# Patient Record
Sex: MALE | Race: WHITE
[De-identification: names, ages, dates, MRNs, and addresses within clinical notes are randomized per-mention and may not be internally consistent; named-entity substitution may affect disease eponyms.]

---

## 2019-09-06 ENCOUNTER — HOSPITAL ENCOUNTER (EMERGENCY)
Dept: HOSPITAL 11 - JP.ED | Age: 29
Discharge: HOME | End: 2019-09-06
Payer: COMMERCIAL

## 2019-09-06 DIAGNOSIS — S61.042A: Primary | ICD-10-CM
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
## 2019-09-06 DIAGNOSIS — Z23: ICD-10-CM

## 2019-09-06 PROCEDURE — 90715 TDAP VACCINE 7 YRS/> IM: CPT

## 2019-09-06 PROCEDURE — 73140 X-RAY EXAM OF FINGER(S): CPT

## 2019-09-06 PROCEDURE — 99283 EMERGENCY DEPT VISIT LOW MDM: CPT

## 2019-09-06 PROCEDURE — 64450 NJX AA&/STRD OTHER PN/BRANCH: CPT

## 2019-09-06 PROCEDURE — 90471 IMMUNIZATION ADMIN: CPT

## 2019-09-06 NOTE — CRLCR
INDICATION:



Nail in thumb.



FINDINGS:



Four views of the left thumb show a nail through the soft tissues over the 

distal tip of the 1st distal phalanx. No evidence of acute fracture. No 

other bony or soft tissue abnormalities identified.



Dictated by Ramon Mazariegos MD @ 9/6/2019 9:20:24 AM



Dictated by: Ramon Mazariegos MD @ 09/06/2019 09:21:09



(Electronically Signed)

## 2019-09-06 NOTE — EDM.PDOC
ED HPI GENERAL MEDICAL PROBLEM





- General


Chief Complaint: General


Stated Complaint: NAIL THROUGH LT THUMB


Time Seen by Provider: 09/06/19 08:25


Source of Information: Reports: Patient


History Limitations: Reports: No Limitations





- History of Present Illness


INITIAL COMMENTS - FREE TEXT/NARRATIVE: 





28 yo male got a nail in the distal L thumb today at work. Uncertain tetanus 

status. Here for removal. 


Onset: Today


Onset Date: 09/06/19


Onset Time: 07:05


Duration: Minutes:, Constant


Location: Reports: Upper Extremity, Left


Quality: Reports: Dull


Severity: Mild


Improves with: Reports: Rest


Worsens with: Reports: Movement


Context: Reports: Trauma


Associated Symptoms: Reports: No Other Symptoms


Treatments PTA: Reports: Other (see below) (none)


  ** Left Finger-Thumb


Pain Score (Numeric/FACES): 6





- Related Data


 Allergies











Allergy/AdvReac Type Severity Reaction Status Date / Time


 


No Known Allergies Allergy   Verified 09/06/19 08:23











Home Meds: 


 Home Meds





NK [No Known Home Meds]  09/06/19 [History]











Past Medical History


HEENT History: Reports: Impaired Vision





- Past Surgical History


Head Surgeries/Procedures: Reports: None


HEENT Surgical History: Reports: None





Social & Family History





- Tobacco Use


Smoking Status *Q: Never Smoker


Second Hand Smoke Exposure: No





- Caffeine Use


Caffeine Use: Reports: None





- Alcohol Use


Days Per Week of Alcohol Use: 1


Number of Drinks Per Day: 1


Total Drinks Per Week: 1





- Recreational Drug Use


Recreational Drug Use: No





ED ROS GENERAL





- Review of Systems


Review Of Systems: See Below


Constitutional: Reports: No Symptoms


Musculoskeletal: Reports: No Symptoms


Skin: Reports: Wound (puncture wound L distal thumb)


Neurological: Reports: No Symptoms





ED EXAM, GENERAL





- Physical Exam


Exam: See Below


Exam Limited By: No Limitations


General Appearance: Alert, WD/WN, No Apparent Distress


Extremities: Other (large nail through and through of distal L thumb, no active 

bleeding.)


Neurological: Alert, Oriented, CN II-XII Intact, Normal Cognition, No Motor/

Sensory Deficits


Psychiatric: Normal Affect, Normal Mood


Skin Exam: Warm, Dry, Normal Color, No Rash, Wound/Incision (puncture of L thumb

, nail still imbedded in distal thumb)





Course





- Vital Signs


Text/Narrative:: 





Nail cut at skin level on the entrance side, also cut the little zonia that was 

protruding. He had been digitally blocked with 5 ml of 2% lidocaine in advance. 

His thumb and the nail were soaked in betadine before extraction. 


Last Recorded V/S: 


 Last Vital Signs











Temp  36.0 C   09/06/19 08:27


 


Pulse  74   09/06/19 08:27


 


Resp  16   09/06/19 08:27


 


BP  145/90 H  09/06/19 08:27


 


Pulse Ox  97   09/06/19 08:27














- Orders/Labs/Meds


Orders: 


 Active Orders 24 hr











 Category Date Time Status


 


 Vaccines to be Administered [RC] PER UNIT ROUTINE Care  09/06/19 08:30 Active











Meds: 


Medications














Discontinued Medications














Generic Name Dose Route Start Last Admin





  Trade Name Freq  PRN Reason Stop Dose Admin


 


Hydrocodone Bitart/Acetaminophen  1 tab  09/06/19 09:29  





  [place] 325-5 Mg  PO  09/06/19 09:30  





  ONETIME ONE   





     





     





     





     


 


Bacitracin  1 dose  09/06/19 08:30  09/06/19 08:52





  Bacitracin Oint 1 Gm  TOP  09/06/19 08:31  1 dose





  ONETIME ONE   Administration





     





     





     





     


 


Diphtheria/Tetanus/Acell Pertussis  0.5 ml  09/06/19 08:29  





  Adacel  IM  09/06/19 08:30  





  .ONCE ONE   





     





     





     





     


 


Lidocaine HCl  6 ml  09/06/19 08:30  09/06/19 08:52





  Xylocaine 2%  INJECT  09/06/19 08:31  6 ml





  ONETIME ONE   Administration





     





     





     





     


 


Povidone Iodine  30 ml  09/06/19 08:40  09/06/19 08:52





  Betadine 10% Soln  TOP  09/06/19 08:41  30 ml





  NOW STA   Administration





     





     





     





     














- Radiology Interpretation


Free Text/Narrative:: 





L thumb X-ray-no fx





Departure





- Departure


Time of Disposition: 09:40


Disposition: Home, Self-Care 01


Condition: Fair


Clinical Impression: 


Puncture wound of thumb with foreign body without damage to nail


Qualifiers:


 Encounter type: initial encounter Laterality: left Qualified Code(s): S61.042A 

- Puncture wound with foreign body of left thumb without damage to nail, 

initial encounter





Clinical Impression: 


 (Ruled Out): Foreign body (FB) in soft tissue, Puncture wound of thumb





- Discharge Information


*PRESCRIPTION DRUG MONITORING PROGRAM REVIEWED*: No


*COPY OF PRESCRIPTION DRUG MONITORING REPORT IN PATIENT ELIAS: No


Instructions:  Puncture Wound, Easy-to-Read


Referrals: 


PCP,None [Primary Care Provider] - 


Forms:  ED Department Discharge


Additional Instructions: 


Clean wound twice daily with soap and water. Dry. Apply antibiotic ointment and 

a new dressing. Keep elevated today to reduce pain and bleeding. Take ibuprofen 

600 mg every 6 hrs with food and/or acetaminophen 1000 mg every 6 hrs for pain 

relief. 





Wound recheck Monday morning in the clinic, return here over the weekend if 

worse. 





- My Orders


Last 24 Hours: 


My Active Orders





09/06/19 08:30


Vaccines to be Administered [RC] PER UNIT ROUTINE 














- Assessment/Plan


Last 24 Hours: 


My Active Orders





09/06/19 08:30


Vaccines to be Administered [RC] PER UNIT ROUTINE

## 2020-07-23 ENCOUNTER — HOSPITAL ENCOUNTER (EMERGENCY)
Dept: HOSPITAL 11 - JP.ED | Age: 30
Discharge: HOME | End: 2020-07-23
Payer: SELF-PAY

## 2020-07-23 DIAGNOSIS — S68.623A: Primary | ICD-10-CM
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
## 2020-07-23 PROCEDURE — 96365 THER/PROPH/DIAG IV INF INIT: CPT

## 2020-07-23 PROCEDURE — 73140 X-RAY EXAM OF FINGER(S): CPT

## 2020-07-23 PROCEDURE — 96375 TX/PRO/DX INJ NEW DRUG ADDON: CPT

## 2020-07-23 PROCEDURE — 99283 EMERGENCY DEPT VISIT LOW MDM: CPT

## 2020-07-23 NOTE — CR
Fingers Third Digit Lt F2

 

CLINICAL HISTORY:  Laceration

 

FINDINGS: Patient has a comminuted displaced fracture of the third middle

phalanx. There is a fracture line extending to the proximal articular margin.

 

Impression: Displaced comminuted fracture third middle phalanx

## 2020-07-23 NOTE — EDM.PDOC
ED HPI GENERAL MEDICAL PROBLEM





- General


Chief Complaint: Upper Extremity Injury/Pain


Stated Complaint: CUT FINGER OFF WITH A SAW


Time Seen by Provider: 07/23/20 14:50


Source of Information: Reports: Patient


History Limitations: Reports: No Limitations





- History of Present Illness


INITIAL COMMENTS - FREE TEXT/NARRATIVE: 





31 yo male accidently nearly completely amputated his L long finger with a power

saw before arrival. His tetanus is UTD. This injury is not work related. 


Onset: Today, Sudden


Onset Date: 07/23/20


Duration: Minutes:, Constant


Location: Reports: Upper Extremity, Left


Quality: Reports: Ache


Severity: Moderate


Improves with: Reports: None


Worsens with: Reports: Other (bumping wound)


Context: Reports: Trauma


Associated Symptoms: Reports: No Other Symptoms


Treatments PTA: Reports: Other (see below) (none)





- Related Data


                                    Allergies











Allergy/AdvReac Type Severity Reaction Status Date / Time


 


No Known Allergies Allergy   Verified 09/06/19 08:23











Home Meds: 


                                    Home Meds





NK [No Known Home Meds]  09/06/19 [History]











Past Medical History


HEENT History: Reports: Impaired Vision





- Past Surgical History


Head Surgeries/Procedures: Reports: None


HEENT Surgical History: Reports: None





Social & Family History





- Tobacco Use


Smoking Status *Q: Never Smoker





- Caffeine Use


Caffeine Use: Reports: None





Review of Systems





- Review of Systems


Review Of Systems: See Below


Constitutional: Reports: No Symptoms


Musculoskeletal: Reports: Hand Pain (L long finger pain from his injury)


Skin: Reports: Wound (L long finger amputation just distal to the PIP st)


Neurological: Reports: Numbness (dsital to the injury)





ED EXAM, GENERAL





- Physical Exam


Exam: See Below


Exam Limited By: No Limitations


General Appearance: Alert, WD/WN, No Apparent Distress


Extremities: Other (Traumatic amputation of the L long finger just distally to 

the PIP jt).  No: Normal Inspection, Normal Range of Motion, Non-Tender, Redness


Neurological: Alert, Oriented, CN II-XII Intact, Normal Cognition.  No: No 

Motor/Sensory Deficits (numbness distal to the injury)


Psychiatric: Normal Affect, Normal Mood


Skin Exam: Warm, Dry, Normal Color, No Rash, Wound/Incision (open wound from 

traumatic finger amputation )





Course





- Vital Signs


Text/Narrative:: 





Dr. El has been called and will see patient in the ER. 


Last Recorded V/S: 


                                Last Vital Signs











Temp  36 C L  07/23/20 14:28


 


Pulse  81   07/23/20 14:28


 


Resp  16   07/23/20 14:28


 


BP  120/73   07/23/20 14:28


 


Pulse Ox  99   07/23/20 14:28














- Orders/Labs/Meds


Orders: 


                               Active Orders 24 hr











 Category Date Time Status


 


 Sodium Chloride 0.9% [Normal Saline] 1,000 ml Med  07/23/20 15:30 Active





 IV ASDIRECTED   








                                Medication Orders





Sodium Chloride (Normal Saline)  1,000 mls @ 150 mls/hr IV ASDIRECTED BOUCHRA


   Last Admin: 07/23/20 15:32  Dose: 150 mls/hr


   Documented by: [id number]








Meds: 


Medications











Generic Name Dose Route Start Last Admin





  Trade Name Freq  PRN Reason Stop Dose Admin


 


Sodium Chloride  1,000 mls @ 150 mls/hr  07/23/20 15:30  07/23/20 15:32





  Normal Saline  IV   150 mls/hr





  ASDIRECTED BOUCHRA   Administration














Discontinued Medications














Generic Name Dose Route Start Last Admin





  Trade Name Freq  PRN Reason Stop Dose Admin


 


Hydromorphone HCl  1 mg  07/23/20 15:21  07/23/20 15:32





  Dilaudid  IVPUSH  07/23/20 15:22  1 mg





  ONETIME ONE   Administration


 


Cefazolin Sodium 1 gm/ Sodium  50 mls @ 100 mls/hr  07/23/20 15:22 





  Chloride  IV  07/23/20 15:51 





  ONETIME ONE  


 


Cefazolin Sodium/Dextrose 1 gm  50 mls @ 100 mls/hr  07/23/20 15:22  07/23/20 

15:32





  / Premix  IV  07/23/20 15:51  100 mls/hr





  ONETIME ONE   Administration


 


Ketorolac Tromethamine  30 mg  07/23/20 15:21  07/23/20 15:32





  Toradol  IVPUSH  07/23/20 15:22  30 mg





  ONETIME ONE   Administration


 


Lidocaine HCl  10 ml  07/23/20 14:26  07/23/20 14:37





  Xylocaine 2%  NERVRT  07/23/20 14:27  10 ml





  ONETIME ONE   Administration














- Radiology Interpretation


Free Text/Narrative:: 





finger X-ray-Fx/amputation at mid portion of middle phalanx, long finger L hand





Departure





- Departure


Time of Disposition: 17:05


Disposition: Home, Self-Care 01


Condition: Fair


Clinical Impression: 


Finger amputation, traumatic


Qualifiers:


 Encounter type: initial encounter Qualified Code(s): S68.119A - Complete 

traumatic metacarpophalangeal amputation of unspecified finger, initial 

encounter








- Discharge Information


*PRESCRIPTION DRUG MONITORING PROGRAM REVIEWED*: No


*COPY OF PRESCRIPTION DRUG MONITORING REPORT IN PATIENT ELIAS: No


Instructions:  Traumatic Finger Amputation


Referrals: 


PCP,None [Primary Care Provider] - 


Forms:  ED Department Discharge


Additional Instructions: 


Keep finger elevated and clean and dry. Take Norco as needed for pain relief. 

Take cephalexin every 8 hrs until gone. See Dr. El in his office in 10 

days. Starting tomorrow night change your dressing twice a day and clean the 

wound with 1/2 water and 1/2 peroxide. Dry. Apply bacitracin ointment. 





Recheck sooner for signs of infection. 





Sepsis Event Note (ED)





- Evaluation


Sepsis Screening Result: No Definite Risk





- Focused Exam


Vital Signs: 


                                   Vital Signs











  Temp Pulse Resp BP Pulse Ox


 


 07/23/20 14:28  36 C L  81  16  120/73  99


 


 07/23/20 14:27  36 C L  81  16  120/73  99














- My Orders


Last 24 Hours: 


My Active Orders





07/23/20 15:30


Sodium Chloride 0.9% [Normal Saline] 1,000 ml IV ASDIRECTED 














- Assessment/Plan


Last 24 Hours: 


My Active Orders





07/23/20 15:30


Sodium Chloride 0.9% [Normal Saline] 1,000 ml IV ASDIRECTED